# Patient Record
Sex: MALE | Race: WHITE | NOT HISPANIC OR LATINO | URBAN - METROPOLITAN AREA
[De-identification: names, ages, dates, MRNs, and addresses within clinical notes are randomized per-mention and may not be internally consistent; named-entity substitution may affect disease eponyms.]

---

## 2022-11-21 ENCOUNTER — EMERGENCY (EMERGENCY)
Facility: HOSPITAL | Age: 67
LOS: 0 days | Discharge: HOME | End: 2022-11-21
Attending: EMERGENCY MEDICINE
Payer: MEDICARE

## 2022-11-21 VITALS
OXYGEN SATURATION: 100 % | HEART RATE: 65 BPM | TEMPERATURE: 98 F | SYSTOLIC BLOOD PRESSURE: 107 MMHG | RESPIRATION RATE: 17 BRPM | DIASTOLIC BLOOD PRESSURE: 71 MMHG

## 2022-11-21 VITALS — WEIGHT: 270.07 LBS | HEIGHT: 71 IN

## 2022-11-21 DIAGNOSIS — Z79.82 LONG TERM (CURRENT) USE OF ASPIRIN: ICD-10-CM

## 2022-11-21 DIAGNOSIS — I25.10 ATHEROSCLEROTIC HEART DISEASE OF NATIVE CORONARY ARTERY WITHOUT ANGINA PECTORIS: ICD-10-CM

## 2022-11-21 DIAGNOSIS — K11.20 SIALOADENITIS, UNSPECIFIED: ICD-10-CM

## 2022-11-21 DIAGNOSIS — R22.0 LOCALIZED SWELLING, MASS AND LUMP, HEAD: ICD-10-CM

## 2022-11-21 DIAGNOSIS — Z95.5 PRESENCE OF CORONARY ANGIOPLASTY IMPLANT AND GRAFT: ICD-10-CM

## 2022-11-21 DIAGNOSIS — I10 ESSENTIAL (PRIMARY) HYPERTENSION: ICD-10-CM

## 2022-11-21 DIAGNOSIS — E78.5 HYPERLIPIDEMIA, UNSPECIFIED: ICD-10-CM

## 2022-11-21 LAB
ALBUMIN SERPL ELPH-MCNC: 3.7 G/DL — SIGNIFICANT CHANGE UP (ref 3.3–5)
ALP SERPL-CCNC: 108 U/L — SIGNIFICANT CHANGE UP (ref 40–120)
ALT FLD-CCNC: 35 U/L — SIGNIFICANT CHANGE UP (ref 12–78)
ANION GAP SERPL CALC-SCNC: 5 MMOL/L — SIGNIFICANT CHANGE UP (ref 5–17)
APPEARANCE UR: CLEAR — SIGNIFICANT CHANGE UP
APTT BLD: 24.3 SEC — LOW (ref 27.5–35.5)
AST SERPL-CCNC: 27 U/L — SIGNIFICANT CHANGE UP (ref 15–37)
BASOPHILS # BLD AUTO: 0.04 K/UL — SIGNIFICANT CHANGE UP (ref 0–0.2)
BASOPHILS NFR BLD AUTO: 0.4 % — SIGNIFICANT CHANGE UP (ref 0–2)
BILIRUB SERPL-MCNC: 1.3 MG/DL — HIGH (ref 0.2–1.2)
BILIRUB UR-MCNC: NEGATIVE — SIGNIFICANT CHANGE UP
BUN SERPL-MCNC: 23 MG/DL — SIGNIFICANT CHANGE UP (ref 7–23)
CALCIUM SERPL-MCNC: 9.5 MG/DL — SIGNIFICANT CHANGE UP (ref 8.5–10.1)
CHLORIDE SERPL-SCNC: 101 MMOL/L — SIGNIFICANT CHANGE UP (ref 96–108)
CO2 SERPL-SCNC: 31 MMOL/L — SIGNIFICANT CHANGE UP (ref 22–31)
COLOR SPEC: YELLOW — SIGNIFICANT CHANGE UP
CREAT SERPL-MCNC: 1.67 MG/DL — HIGH (ref 0.5–1.3)
DIFF PNL FLD: NEGATIVE — SIGNIFICANT CHANGE UP
EGFR: 44 ML/MIN/1.73M2 — LOW
EOSINOPHIL # BLD AUTO: 0.14 K/UL — SIGNIFICANT CHANGE UP (ref 0–0.5)
EOSINOPHIL NFR BLD AUTO: 1.2 % — SIGNIFICANT CHANGE UP (ref 0–6)
GLUCOSE SERPL-MCNC: 128 MG/DL — HIGH (ref 70–99)
GLUCOSE UR QL: NEGATIVE — SIGNIFICANT CHANGE UP
HCT VFR BLD CALC: 40.8 % — SIGNIFICANT CHANGE UP (ref 39–50)
HGB BLD-MCNC: 14.4 G/DL — SIGNIFICANT CHANGE UP (ref 13–17)
IMM GRANULOCYTES NFR BLD AUTO: 0.4 % — SIGNIFICANT CHANGE UP (ref 0–0.9)
INR BLD: 1.12 RATIO — SIGNIFICANT CHANGE UP (ref 0.88–1.16)
KETONES UR-MCNC: NEGATIVE — SIGNIFICANT CHANGE UP
LACTATE SERPL-SCNC: 1.7 MMOL/L — SIGNIFICANT CHANGE UP (ref 0.7–2)
LEUKOCYTE ESTERASE UR-ACNC: NEGATIVE — SIGNIFICANT CHANGE UP
LYMPHOCYTES # BLD AUTO: 1.4 K/UL — SIGNIFICANT CHANGE UP (ref 1–3.3)
LYMPHOCYTES # BLD AUTO: 12.3 % — LOW (ref 13–44)
MCHC RBC-ENTMCNC: 32.4 PG — SIGNIFICANT CHANGE UP (ref 27–34)
MCHC RBC-ENTMCNC: 35.3 GM/DL — SIGNIFICANT CHANGE UP (ref 32–36)
MCV RBC AUTO: 91.9 FL — SIGNIFICANT CHANGE UP (ref 80–100)
MONOCYTES # BLD AUTO: 0.94 K/UL — HIGH (ref 0–0.9)
MONOCYTES NFR BLD AUTO: 8.2 % — SIGNIFICANT CHANGE UP (ref 2–14)
NEUTROPHILS # BLD AUTO: 8.85 K/UL — HIGH (ref 1.8–7.4)
NEUTROPHILS NFR BLD AUTO: 77.5 % — HIGH (ref 43–77)
NITRITE UR-MCNC: NEGATIVE — SIGNIFICANT CHANGE UP
PH UR: 7 — SIGNIFICANT CHANGE UP (ref 5–8)
PLATELET # BLD AUTO: 188 K/UL — SIGNIFICANT CHANGE UP (ref 150–400)
POTASSIUM SERPL-MCNC: 3.2 MMOL/L — LOW (ref 3.5–5.3)
POTASSIUM SERPL-SCNC: 3.2 MMOL/L — LOW (ref 3.5–5.3)
PROT SERPL-MCNC: 8 GM/DL — SIGNIFICANT CHANGE UP (ref 6–8.3)
PROT UR-MCNC: NEGATIVE — SIGNIFICANT CHANGE UP
PROTHROM AB SERPL-ACNC: 13 SEC — SIGNIFICANT CHANGE UP (ref 10.5–13.4)
RBC # BLD: 4.44 M/UL — SIGNIFICANT CHANGE UP (ref 4.2–5.8)
RBC # FLD: 13.1 % — SIGNIFICANT CHANGE UP (ref 10.3–14.5)
SODIUM SERPL-SCNC: 137 MMOL/L — SIGNIFICANT CHANGE UP (ref 135–145)
SP GR SPEC: 1 — LOW (ref 1.01–1.02)
UROBILINOGEN FLD QL: NEGATIVE — SIGNIFICANT CHANGE UP
WBC # BLD: 11.41 K/UL — HIGH (ref 3.8–10.5)
WBC # FLD AUTO: 11.41 K/UL — HIGH (ref 3.8–10.5)

## 2022-11-21 PROCEDURE — 85025 COMPLETE CBC W/AUTO DIFF WBC: CPT

## 2022-11-21 PROCEDURE — 70487 CT MAXILLOFACIAL W/DYE: CPT | Mod: 26,MG

## 2022-11-21 PROCEDURE — 70487 CT MAXILLOFACIAL W/DYE: CPT | Mod: MG

## 2022-11-21 PROCEDURE — 99284 EMERGENCY DEPT VISIT MOD MDM: CPT | Mod: FS

## 2022-11-21 PROCEDURE — 80053 COMPREHEN METABOLIC PANEL: CPT

## 2022-11-21 PROCEDURE — 36415 COLL VENOUS BLD VENIPUNCTURE: CPT

## 2022-11-21 PROCEDURE — G1004: CPT

## 2022-11-21 PROCEDURE — 99284 EMERGENCY DEPT VISIT MOD MDM: CPT | Mod: 25

## 2022-11-21 PROCEDURE — 83605 ASSAY OF LACTIC ACID: CPT

## 2022-11-21 PROCEDURE — 87040 BLOOD CULTURE FOR BACTERIA: CPT | Mod: 91

## 2022-11-21 PROCEDURE — 87086 URINE CULTURE/COLONY COUNT: CPT

## 2022-11-21 PROCEDURE — 81003 URINALYSIS AUTO W/O SCOPE: CPT

## 2022-11-21 PROCEDURE — 85610 PROTHROMBIN TIME: CPT

## 2022-11-21 PROCEDURE — 85730 THROMBOPLASTIN TIME PARTIAL: CPT

## 2022-11-21 RX ORDER — POTASSIUM CHLORIDE 20 MEQ
40 PACKET (EA) ORAL ONCE
Refills: 0 | Status: COMPLETED | OUTPATIENT
Start: 2022-11-21 | End: 2022-11-21

## 2022-11-21 RX ORDER — ACETAMINOPHEN 500 MG
650 TABLET ORAL ONCE
Refills: 0 | Status: COMPLETED | OUTPATIENT
Start: 2022-11-21 | End: 2022-11-21

## 2022-11-21 RX ADMIN — Medication 40 MILLIEQUIVALENT(S): at 13:59

## 2022-11-21 RX ADMIN — Medication 650 MILLIGRAM(S): at 13:14

## 2022-11-21 NOTE — ED STATDOCS - NSICDXPASTMEDICALHX_GEN_ALL_CORE_FT
PAST MEDICAL HISTORY:  CAD (coronary artery disease)     Coronary stent patent     H/O heart artery stent     HLD (hyperlipidemia)     HTN (hypertension)

## 2022-11-21 NOTE — ED ADULT NURSE NOTE - OBJECTIVE STATEMENT
Patient came into the ER with c/o left facial swelling. Patient states he bit his cheek last week and on saturday his face started to swell and went UC. Per patient he was prescribed an abx and has been taking it since yesterday, patient took abx this morning. Patient denies any fevers, chest pain and pain at this time. Patient also denies any allergies.

## 2022-11-21 NOTE — ED ADULT NURSE NOTE - NS ED NURSE LEVEL OF CONSCIOUSNESS AFFECT
metFORMIN (GLUCOPHAGE-XR) 500 MG 24 hr tablet        Last Written Prescription Date:  5/21/20  Last Fill Quantity: 270,   # refills: 3  Last Office Visit: 12/14/20  Future Office visit:       Routing refill request to provider for review/approval because:        blood glucose (NO BRAND SPECIFIED) test strip        Last Written Prescription Date:  12/31/20  Last Fill Quantity: 100,   # refills: 0  Last Office Visit: 12/14/20  Future Office visit:       Routing refill request to provider for review/approval because:      
Calm

## 2022-11-21 NOTE — ED STATDOCS - ENMT, MLM
+significant left facial swelling with some erythema, no dental pain, no dental abscess, ears clear b/l

## 2022-11-21 NOTE — ED STATDOCS - OBJECTIVE STATEMENT
68 y/o male with PMHx of CAD s/p stents, HTN, HLD presents to the ED c/o swelling on the left side of his face since yesterday with overlying redness. Denies fevers. States he has no dental pain, but feels uncomfortable when eating and chewing. Went to  yesterday and was given Augmentin. Pt takes baby aspirin daily.

## 2022-11-21 NOTE — ED STATDOCS - NSFOLLOWUPINSTRUCTIONS_ED_ALL_ED_FT
Parotitis    Outline of the head and upper body showing a parotid gland.   Parotitis means that you have irritation and swelling (inflammation) in one or both of your parotid glands. These glands make saliva. They are found on each side of your face, below and in front of your earlobes. You may or may not have pain with this condition.      What are the causes?    This condition may be caused by:  •Infections from germs (bacteria or viruses).      •Something blocking the flow of saliva through the parotid glands. This can be a stone, scar tissue, or a tumor.      •Diseases that cause your body's defense system (immune system) to attack healthy cells in your salivary glands. These are called autoimmune diseases.        What increases the risk?    •Being 50 years old or older.      •Not drinking enough fluids (being dehydrated).      •Drinking too much alcohol.    •Having:  •A dry mouth.       •Diabetes.       •Gout.      •A long-term illness.         •Not taking good care of your mouth and teeth (poor oral hygiene).      •Having had radiation treatments to the head and neck.      •Taking certain medicines.        What are the signs or symptoms?    •Swelling under and in front of the ear. This may get worse after you eat.      •Pain and tenderness over the parotid gland. This may get worse after you eat.      •Redness and warmth of the skin over the parotid gland.      •Fever or chills.       •Pus coming from the ducts inside the mouth.       •Dry mouth.       •A bad taste in the mouth.        How is this treated?    Treatment depends on the cause. It may include:  •Antibiotic medicine for an infection from bacteria.      •NSAIDs, such as ibuprofen, to treat pain and swelling.      •Drinking more fluids.       •Removing a stone or obstruction.       •Treating a disease that is causing parotitis.      •Surgery to drain an infection, remove a growth, or remove the whole gland.      Treatment may not be needed if the swelling goes away with home care.      Follow these instructions at home:      Medicines   A prescription pill bottle with an example of a pill.   •Take over-the-counter and prescription medicines only as told by your doctor.      •If you were prescribed an antibiotic medicine, take it as told by your doctor. Do not stop taking it even if you start to feel better.      Managing pain and swelling   •If told, put heat on the affected area. Do this as often as told by your doctor. Use the heat source that your doctor recommends, such as a moist heat pack or a heating pad.  •Place a towel between your skin and the heat source.       •Leave the heat on for 20–30 minutes.       •Take off the heat if your skin turns bright red. This is very important. If you cannot feel pain, heat, or cold, you have a greater risk of getting burned.        •Gargle with salt water 3–4 times a day or as needed. To make salt water, dissolve ½–1 tsp (3–6 g) of salt in 1 cup (237 mL) of warm water.      •Gently rub your parotid glands as told by your doctor.        General instructions   Using a toothbrush to brush the front and back sides of the teeth.   •Drink enough fluid to keep your pee (urine) pale yellow.      •Keep your mouth clean and moist.     •Suck on sour candy. This may help to:  •Make your mouth less dry.      •Make more saliva.      •Take good care of your mouth:   •Brush your teeth at least two times a day.      •Floss your teeth every day.       •See your dentist regularly.         • Do not smoke or use any products that contain nicotine or tobacco. If you need help quitting, ask your doctor.      • Do not drink alcohol.      •Keep all follow-up visits.        Contact a doctor if:    •You have a fever or chills.      •You have new symptoms.      •Your symptoms get worse.      •Your symptoms do not get better with treatment.        Get help right away if:    •You have trouble breathing or swallowing.      These symptoms may be an emergency. Get help right away. Call your local emergency services (911 in the [place]   • Do not wait to see if the symptoms will go away.        •  Do not drive yourself to the hospital.         Summary    •Parotitis means that you have irritation and swelling (inflammation) in one or both of your parotid glands.      •Symptoms include pain and swelling under and in front of the ear.      •Treatment for parotitis depends on the cause. In some cases, the condition may go away on its own with home care.      •You should drink plenty of fluids, take good care of your mouth, and do not use products that contain nicotine or tobacco.      This information is not intended to replace advice given to you by your health care provider. Make sure you discuss any questions you have with your health care provider.

## 2022-11-21 NOTE — ED ADULT TRIAGE NOTE - CHIEF COMPLAINT QUOTE
pt c/o left sided facial swelling beginning Saturday. pt was seen at  on Sunday and prescribed Augmentin. pt has taken 2 doses with no relief.

## 2022-11-21 NOTE — ED ADULT TRIAGE NOTE - NS ED TRIAGE AVPU SCALE
Alert-The patient is alert, awake and responds to voice. The patient is oriented to time, place, and person. The triage nurse is able to obtain subjective information.
Name band;

## 2022-11-21 NOTE — ED ADULT NURSE NOTE - FINAL NURSING ELECTRONIC SIGNATURE
21-Nov-2022 15:53 Azathioprine Pregnancy And Lactation Text: This medication is Pregnancy Category D and isn't considered safe during pregnancy. It is unknown if this medication is excreted in breast milk.

## 2022-11-21 NOTE — ED STATDOCS - CLINICAL SUMMARY MEDICAL DECISION MAKING FREE TEXT BOX
Pt here c/o facial swelling, seen at , started on Augmentin, presents with worsening swelling. No fevers, most likely parotitis or facial abscess.

## 2022-11-21 NOTE — ED STATDOCS - PROGRESS NOTE DETAILS
Patient initially seen and evaluated with ED attending at intake.  Reporting Left facial swelling, now with some submandibular swelling, has taken 2 doses of augmentin.  CT with no abscess, advised to continue augmentin.  Incidental of nasal mass reviewed, patient aware of it and will see his ENT in [place].  Return precautions for parotitis reviewed with patient as well as symptom management, sour candies -Yazmin Sanchez PA-C

## 2022-11-21 NOTE — ED ADULT TRIAGE NOTE - HEIGHT IN CM
180.34 Oxybutynin Counseling:  I discussed with the patient the risks of oxybutynin including but not limited to skin rash, drowsiness, dry mouth, difficulty urinating, and blurred vision.

## 2022-11-21 NOTE — ED STATDOCS - ATTENDING APP SHARED VISIT CONTRIBUTION OF CARE
I personally saw the patient with the DEEPTI, and completed the key components of the history and physical exam. I then discussed the management plan with the DEEPTI.

## 2022-11-21 NOTE — ED STATDOCS - PATIENT PORTAL LINK FT
You can access the FollowMyHealth Patient Portal offered by [facility] by registering at the following website: [email or web address]. By joining [employer]’s FollowMyHealth portal, you will also be able to view your health information using other applications (apps) compatible with our system.

## 2022-11-22 LAB
CULTURE RESULTS: NO GROWTH — SIGNIFICANT CHANGE UP
SPECIMEN SOURCE: SIGNIFICANT CHANGE UP

## 2022-11-26 LAB
CULTURE RESULTS: SIGNIFICANT CHANGE UP
CULTURE RESULTS: SIGNIFICANT CHANGE UP
SPECIMEN SOURCE: SIGNIFICANT CHANGE UP
SPECIMEN SOURCE: SIGNIFICANT CHANGE UP